# Patient Record
Sex: FEMALE | Race: BLACK OR AFRICAN AMERICAN
[De-identification: names, ages, dates, MRNs, and addresses within clinical notes are randomized per-mention and may not be internally consistent; named-entity substitution may affect disease eponyms.]

---

## 2018-09-02 ENCOUNTER — HOSPITAL ENCOUNTER (INPATIENT)
Dept: HOSPITAL 25 - ED | Age: 28
LOS: 5 days | Discharge: HOME | DRG: 753 | End: 2018-09-07
Attending: PSYCHIATRY & NEUROLOGY | Admitting: PSYCHIATRY & NEUROLOGY
Payer: MEDICAID

## 2018-09-02 DIAGNOSIS — Z81.4: ICD-10-CM

## 2018-09-02 DIAGNOSIS — F41.9: ICD-10-CM

## 2018-09-02 DIAGNOSIS — F17.210: ICD-10-CM

## 2018-09-02 DIAGNOSIS — F12.90: ICD-10-CM

## 2018-09-02 DIAGNOSIS — F31.9: Primary | ICD-10-CM

## 2018-09-02 DIAGNOSIS — T43.592A: ICD-10-CM

## 2018-09-02 DIAGNOSIS — Z91.19: ICD-10-CM

## 2018-09-02 DIAGNOSIS — Z23: ICD-10-CM

## 2018-09-02 DIAGNOSIS — F14.90: ICD-10-CM

## 2018-09-02 DIAGNOSIS — Z81.1: ICD-10-CM

## 2018-09-02 DIAGNOSIS — G47.00: ICD-10-CM
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
## 2018-09-02 DIAGNOSIS — Z81.8: ICD-10-CM

## 2018-09-02 DIAGNOSIS — Z72.89: ICD-10-CM

## 2018-09-02 LAB
BASOPHILS # BLD AUTO: 0 10^3/UL (ref 0–0.2)
EOSINOPHIL # BLD AUTO: 0.5 10^3/UL (ref 0–0.6)
HCT VFR BLD AUTO: 39 % (ref 35–47)
HGB BLD-MCNC: 13 G/DL (ref 12–16)
LYMPHOCYTES # BLD AUTO: 1 10^3/UL (ref 1–4.8)
MCH RBC QN AUTO: 30 PG (ref 27–31)
MCHC RBC AUTO-ENTMCNC: 34 G/DL (ref 31–36)
MCV RBC AUTO: 87 FL (ref 80–97)
MONOCYTES # BLD AUTO: 0.4 10^3/UL (ref 0–0.8)
NEUTROPHILS # BLD AUTO: 4.4 10^3/UL (ref 1.5–7.7)
NRBC # BLD AUTO: 0 10^3/UL
NRBC BLD QL AUTO: 0.1
PLATELET # BLD AUTO: 148 10^3/UL (ref 150–450)
RBC # BLD AUTO: 4.43 10^6/UL (ref 4–5.4)
RBC UR QL AUTO: (no result)
WBC # BLD AUTO: 6.2 10^3/UL (ref 3.5–10.8)
WBC UR QL AUTO: (no result)

## 2018-09-02 PROCEDURE — 83036 HEMOGLOBIN GLYCOSYLATED A1C: CPT

## 2018-09-02 PROCEDURE — 80061 LIPID PANEL: CPT

## 2018-09-02 PROCEDURE — 87086 URINE CULTURE/COLONY COUNT: CPT

## 2018-09-02 PROCEDURE — 99284 EMERGENCY DEPT VISIT MOD MDM: CPT

## 2018-09-02 PROCEDURE — 82550 ASSAY OF CK (CPK): CPT

## 2018-09-02 PROCEDURE — 84443 ASSAY THYROID STIM HORMONE: CPT

## 2018-09-02 PROCEDURE — 83605 ASSAY OF LACTIC ACID: CPT

## 2018-09-02 PROCEDURE — 80307 DRUG TEST PRSMV CHEM ANLYZR: CPT

## 2018-09-02 PROCEDURE — 85025 COMPLETE CBC W/AUTO DIFF WBC: CPT

## 2018-09-02 PROCEDURE — 90853 GROUP PSYCHOTHERAPY: CPT

## 2018-09-02 PROCEDURE — 90686 IIV4 VACC NO PRSV 0.5 ML IM: CPT

## 2018-09-02 PROCEDURE — 99238 HOSP IP/OBS DSCHRG MGMT 30/<: CPT

## 2018-09-02 PROCEDURE — 81015 MICROSCOPIC EXAM OF URINE: CPT

## 2018-09-02 PROCEDURE — 93005 ELECTROCARDIOGRAM TRACING: CPT

## 2018-09-02 PROCEDURE — 36415 COLL VENOUS BLD VENIPUNCTURE: CPT

## 2018-09-02 PROCEDURE — G0480 DRUG TEST DEF 1-7 CLASSES: HCPCS

## 2018-09-02 PROCEDURE — 80053 COMPREHEN METABOLIC PANEL: CPT

## 2018-09-02 PROCEDURE — 99231 SBSQ HOSP IP/OBS SF/LOW 25: CPT

## 2018-09-02 PROCEDURE — 81003 URINALYSIS AUTO W/O SCOPE: CPT

## 2018-09-02 PROCEDURE — 99222 1ST HOSP IP/OBS MODERATE 55: CPT

## 2018-09-02 PROCEDURE — 84702 CHORIONIC GONADOTROPIN TEST: CPT

## 2018-09-02 PROCEDURE — 80329 ANALGESICS NON-OPIOID 1 OR 2: CPT

## 2018-09-02 PROCEDURE — 80320 DRUG SCREEN QUANTALCOHOLS: CPT

## 2018-09-02 PROCEDURE — 87077 CULTURE AEROBIC IDENTIFY: CPT

## 2018-09-02 PROCEDURE — 87186 SC STD MICRODIL/AGAR DIL: CPT
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
## 2018-09-03 NOTE — ED
Progress





- Progress Note


Progress Note: 


Patient is signed out from Dr. Klein to Dr. Huang upon provider shift change 

pending mental health evaluation.








Course/Dx





- Course


Course Of Treatment: Medications reviewed. Allergies noted.  DISPOSITION AND 

MHE PENDING AT SHIFT CHANGE. Pt signed out from Dr. Johnson to Dr. Klein. Pt 

was signed out from Dr. Klein to Dr. Huang upon provider shift change. Pt will 

be signed out from Dr. Huang to Dr. Klein upon shift change pending transfer.





- Diagnoses


Provider Diagnoses: 


 Bipolar disorder, unspecified








Discharge





- Sign-Out/Discharge


Documenting (check all that apply): Sign-Out Patient


Signing out patient TO: Valerie Klein





- Discharge Plan


Condition: Stable


[place] [place]





- Billing Disposition and Condition


Condition: STABLE


Disposition: Psychiatric Facility [facility]





- Attestation Statements


Document Initiated by Scribe: Yes


Documenting Scribe: Melissa Ramos


Provider For Whom Scribe is Documenting (Include Credential): Prem Huang MD


Scribe Attestation: 


Melissa LEE scribed for Prem Huang MD on 09/08/18 at 1234. 


Scribe Documentation Reviewed: Yes


Provider Attestation: 


The documentation as recorded by the scribeMelissa accurately 

reflects the service I personally performed and the decisions made by Prem mojica MD

## 2018-09-03 NOTE — PN
Progress Note





- Progress Note


Date of Service: 09/03/18


Note: 





Efren LEE, scribed for Dr. Kiara Klein MD on 09/03/18 at 00:55. 

The documentation as recorded by the scribeEfren accurately 

reflects the service I personally performed and the decisions made by me, Dr. Kiara Klein MD 








SIGN OUT FROM DR. JO-ANN GOLDSTEIN MD TO DR. KIARA KLEIN MD AT SHIFT 

CHANGE PENDING DETOX AND MHE. 








COT:


At 00:53 this date, pt is medically clear for mental health evaluation. 








DX: Overdose. Mental health problem.








SIGN OUT TO DR. SANDOVAL NUNEZ MD AT SHIFT CHANGE PENDING MHE.

## 2018-09-04 RX ADMIN — DIVALPROEX SODIUM SCH MG: 500 TABLET, DELAYED RELEASE ORAL at 21:12

## 2018-09-04 RX ADMIN — HYDROXYZINE HYDROCHLORIDE PRN MG: 50 TABLET, FILM COATED ORAL at 15:14

## 2018-09-04 NOTE — ED
Progress





- Progress Note


Progress Note: 


Patient was signed out to Dr. Marilynn Baires via Dr. Valerie Klein, pending 

disposition at shift change on 09/04/2018 at 07:00. 





Pt is here due to stress, SI, and HI. As per boyfriend Asim, she tried to 

commit suicide first by holding a 3 inch-long knife to her throat, then taking 

upwards of 20 prescribed Seroquel pills. This occurred outside, and somebody 

else called 911. As per boyfriend, she was holding the knife to her throat in 

order to coerce him to stay with her, and he only saw her holding the pills in 

her hand. He states he didnt think that she would actually harm herself, and 

has never witnessed her being suicidal before. As per pt, she has tried to kill 

herself before Pt has not been to the psychiatrist in 5 months. Pt smokes 

cigarettes every day, drinks alcohol occasionally, and uses cocaine, crack, and 

pills. Last time she used these substances was 2 days ago, at the time of the 

incident. She has 6 children, but does not have custody of them, and usually 

lives with her boyfriend and his mother.





Re-Evaluation





- Re-Evaluation


  ** First Eval


Re-Evaluation Time: 07:15


Change: Unchanged


Comment: Pt is sleeping, and is accompanied by her boyfriend Asim. She 

denies any physical complaints, and is cooperative at this time. Pt admits to 

both SI and HI, stating she would hurt "anyone who was in the way."





Course/Dx





- Course


Course Of Treatment: Appearance: Well-appearing, moderate pain distress, well-

nourished.  Skin: Warm, color reflects adequate perfusion, dry.  Head: Normal 

Head/Face inspection, atraumatic.  Eyes: Conjunctiva clear.  ENT: Normal 

inspection.  Neck: Supple, no nodes, no JVD.  Respiratory: Lungs clear, normal 

breath sounds, no respiratory distress.  Cardio: RRR, No murmur, pulses normal, 

brisk capillary refill.  Abdomen: Soft, nontender.  Bowel sounds: Present.  

Musculoskeletal: Strength Intact/ROM intact, no calf tenderness, no edema.  

Psychological: Normal.  Neuro: Alert, muscle tone normal, no focal deficit





- Diagnoses


Provider Diagnoses: 


 Bipolar disorder, unspecified








- Provider Notifications


Discussed Care Of Patient With: Ayan Rizvi


Time Discussed With Above Provider: 11:35


Instructed by Provider To: Admit As Inpatient - Dr. Rizvi accepts pt for 

admission





Discharge





- Sign-Out/Discharge


Documenting (check all that apply): Patient Departure - Admit





- Discharge Plan


Condition: Stable


Disposition: PSYCHIATRIC FACILITY-[facility]





- Attestation Statements


Document Initiated by Scribe: Yes


Documenting Scribe: Stephy Pastor


Provider For Whom Scribe is Documenting (Include Credential): Marilynn Baires MD


Scribe Attestation: 


Stephy LEE, scribed for Marilynn Baires MD on 09/04/18 at 1136.

## 2018-09-04 NOTE — ED
Progress





- Progress Note


Progress Note: 


Patient was signed out to Dr. Valerie Klein via Dr. Prem Huang, awaiting 

medical clearance, pending disposition at shift change on 09/03/2018 at 1900. 





Patient was signed out to Dr. Marilynn Baires via Dr. Valerie Klein, awaiting 

medical clearance, pending disposition at shift change on 09/04/2018 at 0700. 





- Consult/PCP


Time Called: 01:15





Course/Dx





- Course


Course Of Treatment: A 29 y/o female BIBA presents to ED s/p questionable 

overdose. Upon entering ED room, the patient is obviously agitated and angry. 

She is uncooperative with the MD questions. No laboratory scans were done. 

Blood work and UA were done. An EKG revealed NSR of 91 BPM, normal ST, no 

ectopy. In the ED course, the patient recieved no medications. Patient was 

signed out to Dr. Marilynn Baires via Dr. Valerie Klein, awaiting medical 

clearance, pending disposition at shift change on 09/04/2018 at 0700.





Discharge





- Sign-Out/Discharge


Documenting (check all that apply): Sign-Out Patient


Signing out patient TO: Marilynn Baires


Receiving patient FROM: Valerie Klein





- Discharge Plan


Condition: Stable


Referrals: 


No Primary Care Phys,NOPCP [Primary Care Provider] - 





- Attestation Statements


Document Initiated by Scribe: Yes


Documenting Scribe: Surinder Trejo


Provider For Whom Scribe is Documenting (Include Credential): Valerie Klein


Scribe Attestation: 


Surinder LEE, scribed for Valerie Klein on 09/04/18 at 0644.

## 2018-09-05 PROCEDURE — GZHZZZZ GROUP PSYCHOTHERAPY: ICD-10-PCS

## 2018-09-05 RX ADMIN — CEPHALEXIN SCH MG: 500 CAPSULE ORAL at 20:01

## 2018-09-05 RX ADMIN — NAPROXEN PRN MG: 250 TABLET ORAL at 18:07

## 2018-09-05 RX ADMIN — CLONIDINE HYDROCHLORIDE SCH MG: 0.1 TABLET ORAL at 20:01

## 2018-09-05 RX ADMIN — HYDROXYZINE HYDROCHLORIDE PRN MG: 50 TABLET, FILM COATED ORAL at 22:37

## 2018-09-05 RX ADMIN — DIVALPROEX SODIUM SCH MG: 500 TABLET, DELAYED RELEASE ORAL at 08:07

## 2018-09-05 RX ADMIN — DIVALPROEX SODIUM SCH MG: 500 TABLET, DELAYED RELEASE ORAL at 20:01

## 2018-09-05 NOTE — PN
MHU: Group Therapy Note





- Service Type


Service Type: [id number] Group Psychotherapy - Medication Education Group: Patient 

presented as disorganized and disruptive in discussion and needed repeated 

redirection to attend to presented materials.  Patient was distractible and 

left within minutes of joining group.

## 2018-09-05 NOTE — PN
MHU: Group Therapy Note





- Service Type


Service Type: 31136 Group Psychotherapy - Cognitive Behavioral Group Therapy (

CBT):Patient was attentive and participatory in CBT programming this morning, 

and remained in good behavioral control.  Patient expressed positive insights 

regarding relevant treatment interventions and goals.
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
## 2018-09-06 RX ADMIN — DIVALPROEX SODIUM SCH MG: 500 TABLET, DELAYED RELEASE ORAL at 08:45

## 2018-09-06 RX ADMIN — CLONIDINE HYDROCHLORIDE SCH MG: 0.1 TABLET ORAL at 21:07

## 2018-09-06 RX ADMIN — CEPHALEXIN SCH MG: 500 CAPSULE ORAL at 21:07

## 2018-09-06 RX ADMIN — CLONIDINE HYDROCHLORIDE SCH MG: 0.1 TABLET ORAL at 08:45

## 2018-09-06 RX ADMIN — CEPHALEXIN SCH MG: 500 CAPSULE ORAL at 08:44

## 2018-09-06 RX ADMIN — HYDROXYZINE HYDROCHLORIDE PRN MG: 50 TABLET, FILM COATED ORAL at 08:45

## 2018-09-06 NOTE — PN
Subjective





- Subjective


Date of Service: 09/06/18


Service Type: 01947 Hosp care 15 min low complexity


Subjective: 





Patient was seen by self, discussed with treatment team, chart was reviewed. 

Patient has been compliant with her medications, no reported side effects other 

than some reduction in blood pressure and feelings slow this morning. Patient 

reports improvement in her mood symptoms, also hyperactivity and impulsivity 

and ability to concentrate. Patient sleeping has been better. Patient eating 

has been fair. Patient has been cooperative with staff. Patient behavior has 

been in better control today. Patient mood was less anxious and euphoric and 

reports improvement in racing thoughts. Patient has been reporting no suicidal 

or homicidal ideation. No psychotic symptoms of delusions or hallucinations.





Objective





- Appearance


Appearance: Thin Framed


Dysmorphic Features: No


Hygiene: Normal


Grooming: Fairly Well Kept





- Behavior


Psychomotor Activities: Normal


Exhibits Abnormal Movement: No





- Attitude and Relatedness


Attitude and Relatedness: Cooperative


Eye Contact: Fair





- Speech


Quality: Unpressured


Latencies: Normal


Quantity: Appropriate





- Mood


Patient's Decription of Mood: "Okay"





- Affect


Observed Affect: Constricted


Affect Consistent with: Euphoria - less





- Thought Process


Patient's Thought Process: Goal Directed, Circumstantial


Thought Content: No Passive Death Wish, No Suicidal Planning, No Homicidal 

Ideation, No Paranoid Ideation





- Sensorium


Experiencing Hallucinations: No, Sensorium is Clear


Type of Hallucinations: Visual: No, Auditory: No, Command: No





- Level of Consciousness


Level of Consciousness: Alert


Orientation: Yes Intact, Yes Orientated to Time, Yes Orientated to Place, Yes 

Orientated to Person





- Impulse Control


Impulse Control: Impaired - but improving





- Insight and Judgement


Insight and Judgement: Poor - but improving





- Group Participation


Particating in Group Activities: Yes





- Medication Management


Medication Management Adherence: Yes





Assessment





- Assessment


Merits Inpatient Hospitalization: For Immediate Safety, For Stabilization, For 

Discharge Planning


Inpatient DSM-V Dx: F31.9


Clinical Impression: 





Patient with history of unspecified mood disorder and heavy substance abuse. 

Patient currently admitted due to impulsive suicidal attempt after recent 

conflict with her boyfriend. Patient has also struggled with her relapse on 

drugs and using cocaine with her marijuana. Patient is a danger to self if 

discharged hence will be stabilized on inpatient unit with medication 

adjustments and therapy.





Plan





- Plan


Treatment Plan: 


Name: SHANON JUAREZ                        


YOB: 1990                        


[id number]


[id number]








- Patient continues to be hospitalized due to recent suicidal attempt, mood 

instability, anxiety and impulsivity.


- Patient's medications were adjusted after informed consent with switching of 

Depakote ER to 1000mg HS. Patient Clonidine was continued at 0.1 mg BID. 

Hydroxyzine was reduced to 25 mg PO Q8HRS PRN anxiety. Patient Seroquel was 

reduced to 25 mg HS.


- Patient will be monitored for improvement and side effects. Risk and benefits 

were discussed.


- Patient was encouraged to continue his participation in the milieu, group and 

individual therapy.


-Patient in the process of being referred to a substance abuse treatment.


Medications: 


 Current Medications





Acetaminophen (Tylenol Tab*)  650 mg PO Q4H PRN


   PRN Reason: for pain; or Temp >101 F


Al Hydrox/Mg Hydrox/Simethicone (Maalox Plus*)  30 ml PO Q4H PRN


   PRN Reason: INDIGESTION


   Last Admin: 09/05/18 07:20 Dose:  30 ml


Cephalexin HCl (Keflex Cap*)  500 mg PO BID MORRIS


   Stop: 09/08/18 09:01


   Last Admin: 09/06/18 08:44 Dose:  500 mg


Clonidine HCl (Catapres Tab*)  0.1 mg PO BID MORRIS


   Last Admin: 09/06/18 08:45 Dose:  0.1 mg


Device (Nicotine Mouth Piece*)  1 each INH .USE WITH NICOTROL PRN


   PRN Reason: CRAVING


   Last Admin: 09/05/18 08:07 Dose:  1 each


Device (Nicotine Mouth Piece*)  1 each INH .USE WITH NICOTROL PRN


   PRN Reason: CRAVING


   Last Admin: 09/04/18 12:59 Dose:  1 each


Divalproex Sodium (Depakote Er Tab(*))  1,000 mg PO BEDTIME MORRIS


Hydroxyzine HCl (Atarax Tab*)  25 mg PO Q8H PRN


   PRN Reason: ANXIETY


Naproxen (Naprosyn Tab*)  500 mg PO Q12H PRN


   PRN Reason: PAIN


   Last Admin: 09/05/18 18:07 Dose:  500 mg


Nicotine (Nicotine Inhaler*)  10 mg INH Q2H PRN


   PRN Reason: CRAVING


   Last Admin: 09/04/18 12:59 Dose:  10 mg


Quetiapine Fumarate (Seroquel Tab*)  25 mg PO BEDTIME PRN


   PRN Reason: insomnia

## 2018-09-07 VITALS — DIASTOLIC BLOOD PRESSURE: 62 MMHG | SYSTOLIC BLOOD PRESSURE: 115 MMHG

## 2018-09-07 RX ADMIN — NAPROXEN PRN MG: 250 TABLET ORAL at 08:47

## 2018-09-07 RX ADMIN — CLONIDINE HYDROCHLORIDE SCH MG: 0.1 TABLET ORAL at 08:46

## 2018-09-07 RX ADMIN — CEPHALEXIN SCH MG: 500 CAPSULE ORAL at 08:46

## 2018-09-07 NOTE — DS
Subjective





- Subjective


Service Types: [address] [facility] Day Mgmt simple under 30 min


Discharge Date: 09/07/18


Subjective: 





JUSTIFICATION FOR ADMISSION:


Patient presented to emergency room with suicidal attempt via overdose, abusing 

substances, worsening depression and multiple psychosocial stressors.  She 

requires inpatient psychiatric admission in order to provide treatment and 

stabilization as she is a danger to herself.  


Source of information: Patient and chart review





CHIEF COMPLAINT: "I want to do a rehab program





HISTORY OF THE PRESENT ILLNESS:


Patient is a  29 y/o female, single, was living with her boyfriend,  never 

employed, with history of unspecified Mood symptoms and heavy substance abuse. 

Patient was admitted to inpatient unit for worsening of her substance abuse and 

relationship difficulty with her boyfriend that lead to her suicidal attempt 

via overdose of 27 tablets of her own Seroquel 200 mg. Patient later presented 

to the hospital for treatment. Patient did not show any EKG changes that were 

concerning. As per poison control recommendations patient was observed for few 

hours and then was medically cleared to be transferred.  Patient has been non-

compliant with her treatment for last 6 months since she came to [place], [place] 

with her boyfriend. Patient reportedly has been self-medicating herself with 

marijuana and cocaine. Patient reports manic symptoms of increase, energy, 

euphoria, and decrease sleep, impulsive behavior, easily distractible, flight 

of ideas and depressive symptoms of feeling down and depressed with anxiety but 

still portray her self being happy.  Patient reports no psychotic symptoms of 

delusion or hallucination. Patient denied any suicidal or homicidal ideation on 

the unit. Patient continued to exhibit behavior that is in control and 

following redirections from the staff. Patient on the unit is highly energetic, 

with tangential thoughts and flight of ideas, impulsive and in euphoric mood.  

  





PAST PSYCHIATRIC HISTORY:


Patient has history of no inpatient psychiatric hospitalization. Patient 

reports self-medicating her symptoms with drugs since age of 13 and did not 

start any treatment until a year ago. Patient has history of outpatient 

psychiatric treatment that started about a year ago and was taking Depakote, 

Seroquel and also reports taking Adderall for ?ADHD. Patients report no side 

effects to medications. Patient has been in no inpatient or outpatient drug 

treatment.  Patient has history of suicidal thoughts and planned to jump out of 

the window when around 6 and 11 but mother was able to intervene and did not 

attempt.  Patient has reported no history of homicidal ideation or attempt but 

in the records she had mentioned about killing two of her babys dadallie. 

Patient has history of aggressive and agitated behavior when decompensates. 

Patient has history of multiple arrest and legal charges but no current pending 

charges. No access to firearm reported.





SUBSTANCE ABUSE HISTORY:


Patient uses cocaine and marijuana variable doses. Patient reports that she can 

use about an ounce of marijuana in 2 days. Urine toxicology was positive for 

both cocaine and cannabis. Last use was before presenting to [facility]. Patient has 

been in no inpatient and outpatient treatment for drugs but showing interest 

and attending groups. Patient reports being abstinent for about 3 years from 

heavy drugs before she relapses about 6 months ago. But did not report any 

clean period from marijuana and showed little interest in quitting that.





PAST MEDICAL HISTORY: No active medical problems





ALLERGIES: NKA 





FAMILY PSYCHIATRIC HISTORY:


Patient has family history of ?Bipolar Disorder in her father as per patient. 

Patient has history of    alcohol and substance abuse in family. Patient 

reported no suicide in the family.





FAMILY/PSYCHOSOCIAL HISTORY:


Patient currently lives with her boyfriend and is planning to return to him 

after treatment. Patient has    6 children but does not have custody of any. 

Patient reports her three children are adopted outside the family. Patients 

first pregnancy was when she was 11 and was in a relationship with a 30 year 

old. Patient completed 11th grade but due to her behavior, substance abuse and 

getting pregnant in her teen age year was unable to pursue further education. 

Patient was raised by her mother and reports having estranged relationship with 

father and endorsed that he was never there during her upbringing. Patient 

support system includes her mother and her boyfriend.





REVIEW OF SYSTEMS:  


Patients review of symptoms was negative for any physical complaint other than 

some discomfort during urination. But patient has been cooperative on the unit 

and vital signs are stable. Patients ED physical exam was reviewed which is 

grossly normal with no active medical problem.





General: well-appearing, no pain distress, patient is agitated.


Skin: warm, color reflects adequate perfusion, dry


Head: normal


Eyes: EOMI, JYOTI


ENT: normal


Neck: supple, nontender


Respiratory: CTA, breath sounds present


Cardiovascular: RRR


Abdomen: soft, nontender


Bowel: present


Musculoskeletal: normal, strength/ROM intact


Neurological: sensory/motor intact, A&O x3





MENTAL STATUS EXAMINATION ON ADMISSION:


Appearance: 28 year old female, appear stated age, hyperactive, making fair but 

intermittent eye contact, easily distracted, fair hygiene and appropriate 

grooming.


Behavior: in better control following redirections


Gait: normal


Abnormal motor activity: none


Speech: normal tone and volume, increase rate normal rhythm


Mood: happy


Affect: euphoric, labile


Thought process: tangential to circumstantial


Thought Content:


   Suicidal/Homicidal ideation: denied


   Delusions: none


Obsessions: none


Phobia: none


Perceptual disturbance: none


Attention: fair


Orientation: grossly intact


Concentration: limited


Memory: fair


Insight: poor as per recent evidence


Judgment: poor as per recent evidence


Impulse control: poor as per recent evidence


                                                                     


DIAGNOSES ON ADMISSION: Bipolar Disorder Unspecified, Cocaine Use Disorder, 

Cannabis Use Disorder





DIAGNOSES ON DISCHARGE: Bipolar Disorder Unspecified, Cocaine Use Disorder, 

Cannabis Use Disorder





Objective





- Appearance


Appearance: Healthy Appearing, Thin Framed


Dysmorphic Features: No


Hygiene: Normal


Grooming: Fairly Well Kept





- Behavior


Psychomotor Activities: Normal


Exhibits Abnormal Movement: No





- Attitude and Relatedness


Attitude and Relatedness: Cooperative


Eye Contact: Fair





- Speech


Quality: Unpressured


Latencies: Normal


Quantity: Appropriate





- Mood


Patient's Decription of Mood: "Fine"





- Affect


Observed Affect: Fair


Affect Consistent with: Euthymia





- Thought Process


Patient's Thought Process: Coherent


Thought Content: No Passive Death Wish, No Suicidal Planning, No Homicidal 

Ideation, No Paranoid Ideation





- Sensorium


Experiencing Hallucinations: No, Sensorium is Clear


Type of Hallucinations: Visual: No, Auditory: No, Command: No





- Level of Consciousness


Level of Consciousness: Alert


Orientation: Yes Intact, Yes Orientated to Time, Yes Orientated to Place, Yes 

Orientated to Person





- Impulse Control


Impulse Control: Intact





- Insight and Judgement


Insight and Judgement: Fair





- Group Participation


Particating in Group Activities: Yes





- Medication Management


Medication Management Adherence: Yes





Treatment Course & Assessment


Clinical Course & Impression: 





Patient is 29 y/o female with history of unspecified mood disorder and heavy 

substance abuse. Patient currently admitted due to impulsive suicidal attempt 

after recent conflict with her boyfriend. Patient has also struggled with her 

relapse on drugs and using cocaine with her marijuana. Patient was a danger to 

self if discharged hence was stabilized on inpatient unit with medication 

adjustments and therapy.





Patient was admitted to [facility] on Q 15 min observation. Patient was on involuntary 

admission status. Patient was integrate into the milieu and therapy. Patient 

gave informed consent to continue Depakote at 500mg BID for mood stabilization. 

Also started Clonidine 0.1 mg BID to help with hyperactivity, restlessness and 

impulsivity. Continue with Hydroxyzine 50 mg PO Q4HRS PRN for anxiety. Will 

reduce Seroquel to 50 mg prn at bedtime for insomnia.


Also discussed with hospitalist about her urinalysis and culture and some 

symptom of feeling discomfort. As per recommendations Keflex 500mg Q12 HRS was 

started for 3 days. Patient was monitor and followed up for improvement and 

side effects.





Patient initially presented as disorganized and disruptive in discussion and 

needed repeated redirection to attend to presented materials.  Patient was 

easily distractible, impulsive and restless. Patient was compliant with her 

medications, no reported side effects other than some reduction in blood 

pressure and feelings slow in the morning. Patient reported improvement in her 

mood symptoms, also hyperactivity and impulsivity and ability to concentrate. 

Patient sleeping was better. Patient eating has been fair. Patient was more 

cooperative with staff. Patient behavior was in better control. Patient mood 

was less anxious and euphoric and reports improvement in racing thoughts. 

Patient was reporting no suicidal or homicidal ideation. No psychotic symptoms 

of delusions or hallucinations. Patient was able to meet with her boyfriend to 

dissolve the conflict and felt better. Patient was looking forward to her child'

s birthday.





Patient's medications were adjusted after informed consent with switching of 

Depakote to ER 1000mg HS. Patient Clonidine was continued at 0.1 mg BID. 

Hydroxyzine was reduced to 25 mg PO Q8HRS PRN anxiety. Patient Seroquel was 

reduced to 25 mg HS with plan to discontinue. Patient was in the process of 

being referred to a substance abuse treatment. Patient was doing better, 

symptoms resolved. Patient mood was stable, no si/hi, no psychotic symptoms 

reported. patient was compliant with her medication and wanted to follow up 

outpatient. Patient offered voluntary stay but wanted to be discharged. Patient 

was discussed with team, did not meet criteria for involuntary hospitalization 

hence was discharged. Patient was offered inpatient rehab but wanted to go to 

any outpaitent substance abuse treatment hence discharged with outpatient 

appointments.


Merits Inpatient Hospitalization: No


Clear for Discharge: Adequate Clinical Respons, Acceptable Safety Profile, Low 

Utility of Inpt Care


Inpatient DSM-V Dx: F31.9





Discharge Planning





- Discharge Planning


Discharge Plan: Outpatient Follow Up


Recommendations for Continuing Care: Medication Management, Psychotherapy, 

Substance Abuse Counseling


Medications: 








Cephalexin HCl (Keflex Cap*)  500 mg PO BID [facility]   #2 tablets


   Stop: 09/08/18 09:01


   Last Admin: 09/06/18 08:44 Dose:  500 mg


Clonidine HCl (Catapres Tab*)  0.1 mg PO BID [facility]   #28 tablets


   Last Admin: 09/06/18 08:45 Dose:  0.1 mg


Divalproex Sodium (Depakote Er Tab(*))  1,000 mg PO BEDTIME [facility]  #14 tablets








Discharge Planning: 


Prescriptions provided for discharge                  [] Yes   [] No   





Follow up care details as per social work arrangements.


Patient response to discharge plan:   


                                                                 [] eager for 

discharge


                                    [] agreeable with discharge plan


                                  [] ambivalent about discharge


                                   [] disagrees with discharge today